# Patient Record
Sex: FEMALE | Race: WHITE | NOT HISPANIC OR LATINO | Employment: OTHER | URBAN - METROPOLITAN AREA
[De-identification: names, ages, dates, MRNs, and addresses within clinical notes are randomized per-mention and may not be internally consistent; named-entity substitution may affect disease eponyms.]

---

## 2019-12-23 ENCOUNTER — LAB (OUTPATIENT)
Dept: LAB | Facility: HOSPITAL | Age: 79
End: 2019-12-23

## 2019-12-23 ENCOUNTER — CONSULT (OUTPATIENT)
Dept: ONCOLOGY | Facility: CLINIC | Age: 79
End: 2019-12-23

## 2019-12-23 VITALS
DIASTOLIC BLOOD PRESSURE: 84 MMHG | OXYGEN SATURATION: 99 % | RESPIRATION RATE: 16 BRPM | SYSTOLIC BLOOD PRESSURE: 204 MMHG | WEIGHT: 160 LBS | TEMPERATURE: 98.1 F | HEIGHT: 60 IN | HEART RATE: 69 BPM | BODY MASS INDEX: 31.41 KG/M2

## 2019-12-23 DIAGNOSIS — C44.92 SQUAMOUS CELL SKIN CANCER: Primary | ICD-10-CM

## 2019-12-23 DIAGNOSIS — C44.92 SQUAMOUS CELL SKIN CANCER: ICD-10-CM

## 2019-12-23 LAB
ALBUMIN SERPL-MCNC: 3.9 G/DL (ref 3.5–5.2)
ALBUMIN/GLOB SERPL: 1.2 G/DL
ALP SERPL-CCNC: 74 U/L (ref 39–117)
ALT SERPL W P-5'-P-CCNC: 12 U/L (ref 1–33)
ANION GAP SERPL CALCULATED.3IONS-SCNC: 12 MMOL/L (ref 5–15)
AST SERPL-CCNC: 17 U/L (ref 1–32)
BILIRUB SERPL-MCNC: 0.2 MG/DL (ref 0.2–1.2)
BUN BLD-MCNC: 11 MG/DL (ref 8–23)
BUN/CREAT SERPL: 17.2 (ref 7–25)
CALCIUM SPEC-SCNC: 9.8 MG/DL (ref 8.6–10.5)
CHLORIDE SERPL-SCNC: 97 MMOL/L (ref 98–107)
CO2 SERPL-SCNC: 26 MMOL/L (ref 22–29)
CREAT BLD-MCNC: 0.64 MG/DL (ref 0.57–1)
ERYTHROCYTE [DISTWIDTH] IN BLOOD BY AUTOMATED COUNT: 13.1 % (ref 12.3–15.4)
GFR SERPL CREATININE-BSD FRML MDRD: 89 ML/MIN/1.73
GLOBULIN UR ELPH-MCNC: 3.2 GM/DL
GLUCOSE BLD-MCNC: 126 MG/DL (ref 65–99)
HCT VFR BLD AUTO: 39.3 % (ref 34–46.6)
HGB BLD-MCNC: 13.3 G/DL (ref 12–15.9)
LYMPHOCYTES # BLD AUTO: 5.1 10*3/MM3 (ref 0.7–3.1)
LYMPHOCYTES NFR BLD AUTO: 47.1 % (ref 19.6–45.3)
MCH RBC QN AUTO: 30.5 PG (ref 26.6–33)
MCHC RBC AUTO-ENTMCNC: 33.7 G/DL (ref 31.5–35.7)
MCV RBC AUTO: 90.5 FL (ref 79–97)
MONOCYTES # BLD AUTO: 1.1 10*3/MM3 (ref 0.1–0.9)
MONOCYTES NFR BLD AUTO: 10.5 % (ref 5–12)
NEUTROPHILS # BLD AUTO: 4.6 10*3/MM3 (ref 1.7–7)
NEUTROPHILS NFR BLD AUTO: 42.4 % (ref 42.7–76)
PLATELET # BLD AUTO: 212 10*3/MM3 (ref 140–450)
PMV BLD AUTO: 7.3 FL (ref 6–12)
POTASSIUM BLD-SCNC: 3.9 MMOL/L (ref 3.5–5.2)
PROT SERPL-MCNC: 7.1 G/DL (ref 6–8.5)
RBC # BLD AUTO: 4.35 10*6/MM3 (ref 3.77–5.28)
SODIUM BLD-SCNC: 135 MMOL/L (ref 136–145)
WBC NRBC COR # BLD: 10.8 10*3/MM3 (ref 3.4–10.8)

## 2019-12-23 PROCEDURE — 80053 COMPREHEN METABOLIC PANEL: CPT

## 2019-12-23 PROCEDURE — 36415 COLL VENOUS BLD VENIPUNCTURE: CPT

## 2019-12-23 PROCEDURE — 85025 COMPLETE CBC W/AUTO DIFF WBC: CPT

## 2019-12-23 PROCEDURE — 99203 OFFICE O/P NEW LOW 30 MIN: CPT | Performed by: INTERNAL MEDICINE

## 2019-12-23 RX ORDER — DICLOFENAC SODIUM 75 MG/1
TABLET, DELAYED RELEASE ORAL
COMMUNITY
Start: 2019-10-25

## 2019-12-23 RX ORDER — POTASSIUM CHLORIDE 750 MG/1
TABLET, FILM COATED, EXTENDED RELEASE ORAL
COMMUNITY
Start: 2019-10-25

## 2019-12-23 RX ORDER — METOPROLOL TARTRATE 50 MG/1
25 TABLET, FILM COATED ORAL 2 TIMES DAILY
COMMUNITY
Start: 2019-10-25

## 2019-12-23 RX ORDER — HYDROCHLOROTHIAZIDE 12.5 MG/1
TABLET ORAL
COMMUNITY
Start: 2019-10-25

## 2019-12-23 RX ORDER — OMEPRAZOLE 40 MG/1
CAPSULE, DELAYED RELEASE ORAL
COMMUNITY
Start: 2019-09-25

## 2019-12-23 RX ORDER — LEVOTHYROXINE SODIUM 0.1 MG/1
TABLET ORAL
COMMUNITY
Start: 2019-11-07

## 2019-12-23 RX ORDER — PYRIDOSTIGMINE BROMIDE 60 MG/1
TABLET ORAL
COMMUNITY
Start: 2019-12-06

## 2019-12-23 RX ORDER — TIZANIDINE 2 MG/1
TABLET ORAL
COMMUNITY
Start: 2019-12-19

## 2019-12-23 NOTE — PROGRESS NOTES
CHIEF COMPLAINT: Squamous cell carcinoma of the skin    REASON FOR REFERRAL: Same      RECORDS OBTAINED  Records of the patients history including those obtained from [facility] were reviewed and summarized in detail.    Oncology/Hematology History    1. 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 Myasthenia gravis  3. Hypothyroidism  4. Diabetes  5. Hyperlipidemia  6. Coronary artery disease  7. Sick sinus syndrome  8. Atrial fibrillation    -12/23/2019 initial [facility] oncology consultation:  She has too numerous to count squamous cell carcinomas of the skin difficult to excise given the number and location with some of them near her eye right on the edge of her eyelid.  She cannot take PDL 1 inhibitors given her myasthenia gravis.  Could give consideration to systemic 5-FU with Capecitabine but before consideration of that, I have a call out to Dr. Damon to see if he plans on giving her topical 5-FU which she says he has been discussing with her.  I will see her back in a few weeks and have left a message with Dr. Damon to call me for us to discuss her care.        Squamous cell skin cancer    12/23/2019 Initial Diagnosis     Squamous cell skin cancer         HISTORY OF PRESENT ILLNESS:  The patient is a 79 y.o.  female, referred for too numerous to surgically excise squamous cell carcinomas of the skin predominantly on the head.  Also has a large wound on the dorsum of her left foot.    REVIEW OF SYSTEMS:  A 14 point review of systems was performed and is negative except as noted above.    History reviewed. No pertinent past medical history.  History reviewed. No pertinent surgical history.    Current Outpatient Medications on File Prior to Visit   Medication Sig Dispense Refill   • diclofenac (VOLTAREN) 75 MG EC tablet      • hydroCHLOROthiazide (HYDRODIURIL) 12.5 MG tablet      • levothyroxine (SYNTHROID, LEVOTHROID) 100 MCG tablet      • metoprolol tartrate (LOPRESSOR)  "50 MG tablet Take 25 mg by mouth 2 (Two) Times a Day.     • omeprazole (priLOSEC) 40 MG capsule      • potassium chloride (K-DUR) 10 MEQ CR tablet      • pyridostigmine (MESTINON) 60 MG tablet      • tiZANidine (ZANAFLEX) 2 MG tablet        No current facility-administered medications on file prior to visit.        Allergies   Allergen Reactions   • Lisinopril Cough   • Other Hives     Nalson is the medication.   • Sulfa Antibiotics Unknown - Low Severity     PT was a child and does not remember       Social History     Socioeconomic History   • Marital status:      Spouse name: Not on file   • Number of children: Not on file   • Years of education: Not on file   • Highest education level: Not on file       History reviewed. No pertinent family history.    PHYSICAL EXAM:    BP (!) 204/84   Pulse 69   Temp 98.1 °F (36.7 °C)   Resp 16   Ht 152.4 cm (60\")   Wt 72.6 kg (160 lb)   SpO2 99%   BMI 31.25 kg/m²     ECOG: (1) Restricted in physically strenuous activity, ambulatory and able to do work of light nature  General: well appearing female in no acute distress  HEENT: sclera anicteric, oropharynx clear  Lymphatics: no cervical, supraclavicular, inguinal, or axillary adenopathy  Cardiovascular: regular rate and rhythm, no murmurs  Neck: Supple; No thyromegaly  Lungs: clear to auscultation bilaterally. No respiratory distress.   Abdomen: soft, nontender, nondistended.  No palpable organomegaly  Extremities: no cyanosis, clubbing, edema, or cords  Skin: Too numerous to count multiple solar keratoses and squamous cell skin cancers dorsum of right foot and multiple around her face and scalp and hands  Neuro: Alert and oriented x 4; Moving all extremities.  Psych: No anxiety or depression    No results found for: HGB, HCT, MCV, PLT, WBC, NEUTROABS, LYMPHSABS, MONOSABS, EOSABS, BASOSABS  No results found for: GLUCOSE, BUN, CREATININE, NA, K, CL, CO2, CALCIUM, PROTEINTOT, ALBUMIN, BILITOT, ALKPHOS, AST, " ALT        Assessment/Plan     1. 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 Myasthenia gravis  3. Hypothyroidism  4. Diabetes  5. Hyperlipidemia  6. Coronary artery disease  7. Sick sinus syndrome  8. Atrial fibrillation    -12/23/2019 initial [facility] medical oncology consultation:  She has too numerous to count squamous cell carcinomas of the skin difficult to excise given the number and location with some of them near her eye right on the edge of her eyelid.  She cannot take PDL 1 inhibitors given her myasthenia gravis.  Could give consideration to systemic 5-FU with Capecitabine but before consideration of that, I have a call out to Dr. Damon to see if he plans on giving her topical 5-FU which she says he has been discussing with her.  I will see her back in a few weeks and have left a message with Dr. Damon to call me for us to discuss her care.  Discussed with patient face-to-face 30 minutes greater than 50% spent counseling regarding this plan.  We will check a baseline blood count and chemistry today.      Keegan Valero MD    12/23/2019

## 2020-01-13 ENCOUNTER — OFFICE VISIT (OUTPATIENT)
Dept: ONCOLOGY | Facility: CLINIC | Age: 80
End: 2020-01-13

## 2020-01-13 VITALS
RESPIRATION RATE: 16 BRPM | SYSTOLIC BLOOD PRESSURE: 190 MMHG | OXYGEN SATURATION: 97 % | WEIGHT: 160 LBS | DIASTOLIC BLOOD PRESSURE: 80 MMHG | HEART RATE: 62 BPM | BODY MASS INDEX: 31.41 KG/M2 | HEIGHT: 60 IN | TEMPERATURE: 97.7 F

## 2020-01-13 DIAGNOSIS — C44.92 SQUAMOUS CELL SKIN CANCER: Primary | ICD-10-CM

## 2020-01-13 PROCEDURE — 99213 OFFICE O/P EST LOW 20 MIN: CPT | Performed by: INTERNAL MEDICINE

## 2020-01-13 NOTE — PROGRESS NOTES
CHIEF COMPLAINT: Squamous cell carcinomas of the skin    Problem List:  Oncology/Hematology History    1. 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 Myasthenia gravis  3. Hypothyroidism  4. Diabetes  5. Hyperlipidemia  6. Coronary artery disease  7. Sick sinus syndrome  8. Atrial fibrillation      She has too numerous to count squamous cell carcinomas of the skin difficult to excise given the number and location with some of them near her eye right on the edge of her eyelid.  She cannot take PDL 1 inhibitors given her myasthenia gravis.  Could give consideration to systemic 5-FU with Capecitabine but before consideration of that, I called Dr. Damon and spoke with him regarding this conundrum and he would prefer to try further topical/surgical approaches first and she certainly is not interested in systemic therapy at this junction.  I will see her back on an as-needed basis.        Squamous cell skin cancer    12/23/2019 Initial Diagnosis     Squamous cell skin cancer         HISTORY OF PRESENT ILLNESS:  The patient is a 79 y.o. female, here for follow up on management of squamous cell carcinomas of the skin      History reviewed. No pertinent past medical history.  History reviewed. No pertinent surgical history.    Allergies   Allergen Reactions   • Lisinopril Cough   • Other Hives     Nalson is the medication.   • Sulfa Antibiotics Unknown - Low Severity     PT was a child and does not remember       Family History and Social History reviewed and changed as necessary      REVIEW OF SYSTEM:   Review of Systems   Constitutional: Negative for appetite change, chills, diaphoresis, fatigue, fever and unexpected weight change.   HENT:   Negative for mouth sores, sore throat and trouble swallowing.    Eyes: Negative for icterus.   Respiratory: Negative for cough, hemoptysis and shortness of breath.    Cardiovascular: Negative for chest pain, leg swelling and palpitations.  "  Gastrointestinal: Negative for abdominal distention, abdominal pain, blood in stool, constipation, diarrhea, nausea and vomiting.   Endocrine: Negative for hot flashes.   Genitourinary: Negative for bladder incontinence, difficulty urinating, dysuria, frequency and hematuria.    Musculoskeletal: Negative for gait problem, neck pain and neck stiffness.   Skin: Negative for rash.   Neurological: Negative for dizziness, gait problem, headaches, light-headedness and numbness.   Hematological: Negative for adenopathy. Does not bruise/bleed easily.   Psychiatric/Behavioral: Negative for depression. The patient is not nervous/anxious.    All other systems reviewed and are negative.       PHYSICAL EXAM    Vitals:    01/13/20 1445   BP: (!) 190/80   Pulse: 62   Resp: 16   Temp: 97.7 °F (36.5 °C)   SpO2: 97%   Weight: 72.6 kg (160 lb)   Height: 152.4 cm (60\")     Constitutional: Appears well-developed and well-nourished. No distress.   ECOG: (1) Restricted in physically strenuous activity, ambulatory and able to do work of light nature  HENT:   Head: Normocephalic.   Mouth/Throat: Oropharynx is clear and moist.   Eyes: Conjunctivae are normal. Pupils are equal, round, and reactive to light. No scleral icterus.   Neck: Neck supple. No JVD present. No thyromegaly present.   Cardiovascular: Normal rate, regular rhythm and normal heart sounds.    Pulmonary/Chest: Breath sounds normal. No respiratory distress.   Abdominal: Soft. Exhibits no distension and no mass. There is no hepatosplenomegaly. There is no tenderness. There is no rebound and no guarding.   Musculoskeletal:Exhibits no edema, tenderness or deformity.   Neurological: Alert and oriented to person, place, and time. Exhibits normal muscle tone.   Skin: Innumerable keratoses and squamous cell carcinomas of the skin some near her palpebral fissure left eye.  Psychiatric: Normal mood and affect.   Vitals reviewed.      Lab Results   Component Value Date    HGB 13.3 " 12/23/2019    HCT 39.3 12/23/2019    MCV 90.5 12/23/2019     12/23/2019    WBC 10.80 12/23/2019    NEUTROABS 4.60 12/23/2019    LYMPHSABS 5.10 (H) 12/23/2019    MONOSABS 1.10 (H) 12/23/2019       Lab Results   Component Value Date    GLUCOSE 126 (H) 12/23/2019    BUN 11 12/23/2019    CREATININE 0.64 12/23/2019     (L) 12/23/2019    K 3.9 12/23/2019    CL 97 (L) 12/23/2019    CO2 26.0 12/23/2019    CALCIUM 9.8 12/23/2019    PROTEINTOT 7.1 12/23/2019    ALBUMIN 3.90 12/23/2019    BILITOT 0.2 12/23/2019    ALKPHOS 74 12/23/2019    AST 17 12/23/2019    ALT 12 12/23/2019                   ASSESSMENT & PLAN:    1. 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 Myasthenia gravis  3. Hypothyroidism  4. Diabetes  5. Hyperlipidemia  6. Coronary artery disease  7. Sick sinus syndrome  8. Atrial fibrillation      Discussion: She has too numerous to count squamous cell carcinomas of the skin difficult to excise given the number and location with some of them near her eye right on the edge of her eyelid.  She cannot take PDL 1 inhibitors given her myasthenia gravis.  Could give consideration to systemic 5-FU with Capecitabine but before consideration of that, I called Dr. Damon and spoke with him regarding this conundrum and he would prefer to try further topical/surgical approaches first and she certainly is not interested in systemic therapy at this junction.  I will see her back on an as-needed basis.  Discussed with patient 15 minutes greater than 50% spent counseling regarding this plan as well as with Dr. Damon.      Keegan Valero MD    01/13/2020

## 2020-06-05 ENCOUNTER — TELEPHONE (OUTPATIENT)
Dept: ONCOLOGY | Facility: CLINIC | Age: 80
End: 2020-06-05